# Patient Record
Sex: MALE | Race: WHITE | NOT HISPANIC OR LATINO
[De-identification: names, ages, dates, MRNs, and addresses within clinical notes are randomized per-mention and may not be internally consistent; named-entity substitution may affect disease eponyms.]

---

## 2022-04-12 ENCOUNTER — APPOINTMENT (OUTPATIENT)
Dept: ORTHOPEDIC SURGERY | Facility: CLINIC | Age: 52
End: 2022-04-12
Payer: COMMERCIAL

## 2022-04-12 VITALS — BODY MASS INDEX: 45 KG/M2 | HEIGHT: 66 IN | WEIGHT: 280 LBS

## 2022-04-12 DIAGNOSIS — Z78.9 OTHER SPECIFIED HEALTH STATUS: ICD-10-CM

## 2022-04-12 DIAGNOSIS — I10 ESSENTIAL (PRIMARY) HYPERTENSION: ICD-10-CM

## 2022-04-12 PROCEDURE — 99214 OFFICE O/P EST MOD 30 MIN: CPT | Mod: 25

## 2022-04-12 NOTE — PROCEDURE
[Large Joint Injection] : Large joint injection [Left] : of the left [Knee] : knee [Pain] : pain [Inflammation] : inflammation [Repeat series performed] : repeat series performed [Alcohol] : alcohol [Betadine] : betadine [Ethyl Chloride sprayed topically] : ethyl chloride sprayed topically [Sterile technique used] : sterile technique used [___ cc    3mg] :  Betamethasone (Celestone) ~Vcc of 3mg [___ cc    1%] : Lidocaine ~Vcc of 1%  [___ cc    0.25%] : Bupivacaine (Marcaine) ~Vcc of 0.25%  [] : Patient tolerated procedure well [Call if redness, pain or fever occur] : call if redness, pain or fever occur [Apply ice for 15min out of every hour for the next 12-24 hours as tolerated] : apply ice for 15 minutes out of every hour for the next 12-24 hours as tolerated [Previous OTC use and PT nontherapeutic] : patient has tried OTC's including aspirin, Ibuprofen, Aleve, etc or prescription NSAIDS, and/or exercises at home and/or physical therapy without satisfactory response [Patient had decreased mobility in the joint] : patient had decreased mobility in the joint [Risks, benefits, alternatives discussed / Verbal consent obtained] : the risks benefits, and alternatives have been discussed, and verbal consent was obtained [Prior failure or difficult injection] : prior failure or difficult injection [Altered anatomic landmarks d/t erosive arthritis] : altered anatomic landmarks d/t erosive arthritis [All ultrasound images have been permanently captured and stored accordingly in our picture archiving and communication system] : All ultrasound images have been permanently captured and stored accordingly in our picture archiving and communication system

## 2022-04-13 RX ORDER — AZELASTINE HYDROCHLORIDE 137 UG/1
0.1 SPRAY, METERED NASAL
Qty: 30 | Refills: 0 | Status: ACTIVE | COMMUNITY
Start: 2021-12-28

## 2022-04-13 RX ORDER — BUDESONIDE AND FORMOTEROL FUMARATE DIHYDRATE 80; 4.5 UG/1; UG/1
80-4.5 AEROSOL RESPIRATORY (INHALATION)
Qty: 10 | Refills: 0 | Status: ACTIVE | COMMUNITY
Start: 2021-12-28

## 2022-04-13 RX ORDER — HYDROCHLOROTHIAZIDE 25 MG/1
25 TABLET ORAL
Qty: 90 | Refills: 0 | Status: ACTIVE | COMMUNITY
Start: 2022-02-10

## 2022-04-13 RX ORDER — HYDROCHLOROTHIAZIDE 12.5 MG/1
12.5 TABLET ORAL
Qty: 90 | Refills: 0 | Status: ACTIVE | COMMUNITY
Start: 2021-02-11

## 2022-04-13 RX ORDER — AMLODIPINE BESYLATE 5 MG/1
5 TABLET ORAL
Qty: 90 | Refills: 0 | Status: ACTIVE | COMMUNITY
Start: 2021-02-11

## 2022-04-13 RX ORDER — OLMESARTAN MEDOXOMIL 20 MG/1
20 TABLET, FILM COATED ORAL
Qty: 90 | Refills: 0 | Status: ACTIVE | COMMUNITY
Start: 2022-02-10

## 2022-04-13 RX ORDER — MONTELUKAST 10 MG/1
10 TABLET, FILM COATED ORAL
Qty: 30 | Refills: 0 | Status: ACTIVE | COMMUNITY
Start: 2021-12-28

## 2022-04-13 RX ORDER — DOXYCYCLINE HYCLATE 100 MG/1
100 TABLET ORAL
Qty: 14 | Refills: 0 | Status: ACTIVE | COMMUNITY
Start: 2021-12-28

## 2022-04-13 NOTE — DISCUSSION/SUMMARY
[Surgical risks reviewed] : Surgical risks reviewed [de-identified] : CSI today and tolerated well. \par Surg/Non surg options discussed.\par Risk/Benefits reviewed. \par Recommend left knee arthroscopy, partial meniscectomy at this time due to pain and loss of motion.\par Risk of infection clot cont pain recurrent tear and need for further care and surgery discussed \par Advised this surgery will not help his OA and poss viscosupp after surgery discussed \par He understands he will not be 100% better. \par All questions answered and he would like to proceed. \par \par General Dx Discussion\par The patient was advised of the diagnosis. The natural history of the pathology was explained in full to the patient in layman's terms. All questions were answered. The risks and benefits of surgical and non-surgical treatment alternatives were explained in full to the patient.\par \par The patient was advised of the diagnosis.  The natural history of the pathology was explained in full to the patient in layman's terms. All questions were answered.  The risks and benefits of surgical and non-surgical treatment alternatives were explained in full to the patient.  \par The patient demonstrated a full understanding of the surgical and non-surgical options.  The risks of surgery were outlined in full to the patient including but not limited to bleeding, scarring, infection, sepsis, neurologic injury, vascular injury, failure to resolve symptoms, symptom recurrence, the need for further surgery, non-healing, wound breakdown, deep vein thrombosis, pulmonary embolism, spontaneous osteonecrosis, anesthesia complications and even death.  The patient understood all the risks and accepted them and understood that other complications could occur that are not mentioned above.  The intraoperative plan, post-operative plan, post-operative expectations and limitations were explained in full.  Expectations from non-surgical treatment were explained in full as well.  The patient demonstrated a complete understanding of the treatment alternatives and requested the above-mentioned procedure.  This will be scheduled accordingly.\par \par The patient has two pathologic conditions at this point.  There is a meniscus tear which is causing symptomology and there is also underlying osteoarthritis.  The patient was counseled that surgical intervention to address the torn meniscus will not change the symptomology attributable to the arthritis.  The patient was advised that the pain attributable to the meniscus tear should be resolved arthroscopically.  However, the patient should expect to have continued aches and pains related to the arthritis, in the form of, but not limited to pain, weather related changes, dull ache, crepitation, limitation of motion and strength and the need for further surgeries. The patient understands that the arthroscopic procedure addresses the meniscus only and that after surgery, the knee will not be 100% but it should be improved with respect to the symptomology attributed to the torn meniscus.  Patient also is aware that further treatment after arthroscopy may be necessary in the form of oral medications, cortisone and/or viscosupplementation injections, and further surgeries including knee replacement.  The patient agrees, understands and wishes to proceed.\par \par \par

## 2022-04-13 NOTE — PHYSICAL EXAM
[Left] : left knee [5___] : hamstring 5[unfilled]/5 [Positive] : positive Manjit [] : no erythema [TWNoteComboBox7] : flexion 100 degrees [de-identified] : extension 0 degrees

## 2022-04-13 NOTE — HISTORY OF PRESENT ILLNESS
[5] : 5 [6] : 6 [Burning] : burning [Rest] : rest [Full time] : Work status: full time [de-identified] : Here for f/u left knee. He had CSI 5 weeks ago and was feeling better, but he now has increasing pain. No new injury. He is leaving for [place] tomorrow and he would like another injection. He would also like to discuss surgery.  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

## 2022-06-07 ENCOUNTER — RX RENEWAL (OUTPATIENT)
Age: 52
End: 2022-06-07

## 2022-06-07 RX ORDER — MELOXICAM 15 MG/1
15 TABLET ORAL
Qty: 30 | Refills: 2 | Status: ACTIVE | COMMUNITY
Start: 2022-02-22 | End: 1900-01-01

## 2022-06-14 ENCOUNTER — APPOINTMENT (OUTPATIENT)
Dept: ORTHOPEDIC SURGERY | Facility: CLINIC | Age: 52
End: 2022-06-14
Payer: COMMERCIAL

## 2022-06-14 PROCEDURE — 99213 OFFICE O/P EST LOW 20 MIN: CPT

## 2022-06-14 NOTE — HISTORY OF PRESENT ILLNESS
[7] : 7 [5] : 5 [de-identified] : Here for f/u left knee. He was doing well until last week he started having increasing pain. No new injury. \par He would like to schedule surgery for September because of his job. He is requesting another CSI today.  [FreeTextEntry1] : left knee [FreeTextEntry5] : /

## 2022-06-14 NOTE — DISCUSSION/SUMMARY
[Surgical risks reviewed] : Surgical risks reviewed [de-identified] : General Dx Discussion\par The patient was advised of the diagnosis. The natural history of the pathology was explained in full to the patient in layman's terms. All questions were answered. The risks and benefits of surgical and non-surgical treatment alternatives were explained in full to the patient.\par \par \par He is considering arthroscopic surgery for this september. \par He has mobic he will take prn. \par Discussed Repeat CSI if needed, however patient advised he cannot have surgery for 1 month after CSI. \par All questions answered and he understood. \par \par Entered by Bernadette ELIAS acting as a scribe.\par Instructions: Dr. Colon- The documentation recorded by the scribe accurately reflects the service I personally performed and the decisions made by me.\par

## 2022-06-14 NOTE — PHYSICAL EXAM
[Left] : left knee [] : no atrophy [TWNoteComboBox7] : flexion 120 degrees [de-identified] : extension 0 degrees

## 2022-06-21 ENCOUNTER — FORM ENCOUNTER (OUTPATIENT)
Age: 52
End: 2022-06-21

## 2022-06-21 ENCOUNTER — APPOINTMENT (OUTPATIENT)
Dept: ORTHOPEDIC SURGERY | Facility: CLINIC | Age: 52
End: 2022-06-21

## 2022-07-04 ENCOUNTER — NON-APPOINTMENT (OUTPATIENT)
Age: 52
End: 2022-07-04

## 2022-07-06 ENCOUNTER — APPOINTMENT (OUTPATIENT)
Dept: ORTHOPEDIC SURGERY | Facility: AMBULATORY SURGERY CENTER | Age: 52
End: 2022-07-06

## 2022-07-06 PROCEDURE — 29875 ARTHRS KNEE SURG SYNVCT LMTD: CPT | Mod: 59,LT

## 2022-07-06 PROCEDURE — 29881 ARTHRS KNE SRG MNISECTMY M/L: CPT | Mod: LT

## 2022-07-06 PROCEDURE — 29881 ARTHRS KNE SRG MNISECTMY M/L: CPT | Mod: AS,LT

## 2022-07-06 PROCEDURE — 29875 ARTHRS KNEE SURG SYNVCT LMTD: CPT | Mod: AS,59,LT

## 2022-07-06 RX ORDER — OXYCODONE AND ACETAMINOPHEN 5; 325 MG/1; MG/1
5-325 TABLET ORAL
Qty: 30 | Refills: 0 | Status: COMPLETED | COMMUNITY
Start: 2022-07-06 | End: 2022-07-14

## 2022-07-06 RX ORDER — ASPIRIN/ACETAMINOPHEN/CAFFEINE 500-325-65
325 POWDER IN PACKET (EA) ORAL
Qty: 30 | Refills: 0 | Status: COMPLETED | COMMUNITY
Start: 2022-07-06 | End: 2022-08-05

## 2022-07-07 ENCOUNTER — NON-APPOINTMENT (OUTPATIENT)
Age: 52
End: 2022-07-07

## 2022-07-12 ENCOUNTER — APPOINTMENT (OUTPATIENT)
Dept: ORTHOPEDIC SURGERY | Facility: CLINIC | Age: 52
End: 2022-07-12

## 2022-07-12 VITALS — HEIGHT: 66 IN | WEIGHT: 280 LBS | BODY MASS INDEX: 45 KG/M2

## 2022-07-12 PROCEDURE — 99024 POSTOP FOLLOW-UP VISIT: CPT

## 2022-07-14 NOTE — PHYSICAL EXAM
[Left] : left knee [5___] : hamstring 5[unfilled]/5 [] : no calf tenderness [TWNoteComboBox7] : flexion 95 degrees [de-identified] : extension 0 degrees

## 2022-07-14 NOTE — HISTORY OF PRESENT ILLNESS
[4] : 4 [2] : 2 [Not working due to injury] : Work status: not working due to injury [de-identified] : no treatment at this time

## 2022-07-14 NOTE — DISCUSSION/SUMMARY
[de-identified] : General Dx Discussion\par The patient was advised of the diagnosis. The natural history of the pathology was explained in full to the patient in layman's terms. All questions were answered. The risks and benefits of surgical and non-surgical treatment alternatives were explained in full to the patient.\par \par will get PT \par will resume work 7/25/22 no kneeling

## 2022-07-30 ENCOUNTER — TRANSCRIPTION ENCOUNTER (OUTPATIENT)
Age: 52
End: 2022-07-30

## 2022-08-09 ENCOUNTER — APPOINTMENT (OUTPATIENT)
Dept: ORTHOPEDIC SURGERY | Facility: CLINIC | Age: 52
End: 2022-08-09

## 2022-08-09 VITALS — BODY MASS INDEX: 45 KG/M2 | HEIGHT: 66 IN | WEIGHT: 280 LBS

## 2022-08-09 DIAGNOSIS — M65.9 SYNOVITIS AND TENOSYNOVITIS, UNSPECIFIED: ICD-10-CM

## 2022-08-09 PROCEDURE — 99024 POSTOP FOLLOW-UP VISIT: CPT

## 2022-08-09 NOTE — PHYSICAL EXAM
[Left] : left knee [] : patient ambulates without assistive device [TWNoteComboBox7] : flexion 110 degrees [de-identified] : extension 0 degrees

## 2022-08-09 NOTE — HISTORY OF PRESENT ILLNESS
[1] : 2 [Localized] : localized [Physical therapy] : physical therapy [Full time] : Work status: full time [de-identified] : Here for f/u left knee s/p arthroscopy, meniscectomy. He is doing well. Reports minimal pain. He only went for 2 sessions of therapy because he had COVID.  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

## 2022-08-09 NOTE — DISCUSSION/SUMMARY
[de-identified] : Case Discussed.\par Recommend getting back into a coures of physical therapy.\par Activities as tolerated.\par Discussed the possibility of visco injections if symptoms worsen. \par f/u 6 weeks. \par \par Entered by Bernadette ELIAS acting as a scribe.\par Instructions: Dr. Colon- The documentation recorded by the scribe accurately reflects the service I personally performed and the decisions made by me.\par

## 2022-10-04 ENCOUNTER — APPOINTMENT (OUTPATIENT)
Dept: ORTHOPEDIC SURGERY | Facility: CLINIC | Age: 52
End: 2022-10-04

## 2022-10-04 VITALS — WEIGHT: 280 LBS | BODY MASS INDEX: 45 KG/M2 | HEIGHT: 66 IN

## 2022-10-04 DIAGNOSIS — M17.12 UNILATERAL PRIMARY OSTEOARTHRITIS, LEFT KNEE: ICD-10-CM

## 2022-10-04 DIAGNOSIS — Z98.890 OTHER SPECIFIED POSTPROCEDURAL STATES: ICD-10-CM

## 2022-10-04 DIAGNOSIS — S83.242D OTHER TEAR OF MEDIAL MENISCUS, CURRENT INJURY, LEFT KNEE, SUBSEQUENT ENCOUNTER: ICD-10-CM

## 2022-10-04 PROCEDURE — 99213 OFFICE O/P EST LOW 20 MIN: CPT | Mod: 24

## 2022-10-04 NOTE — DISCUSSION/SUMMARY
[de-identified] : Case Discussed.\par Doing better. \par Recommend and request authorization for Euflexxa injections. \par Continue hep. \par \par Entered by Bernadette ELIAS acting as a scribe.\par Instructions: Dr. Colon- The documentation recorded by the scribe accurately reflects the service I personally performed and the decisions made by me.\par

## 2022-10-04 NOTE — HISTORY OF PRESENT ILLNESS
[Localized] : localized [Occasional] : occasional [Work] : work [Full time] : Work status: full time [1] : 2 [Physical therapy] : physical therapy [de-identified] : Here for f/u left knee s/p arthroscopy, meniscectomy on 7/6/22.. He is doing well. Reports minimal pain.  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

## 2022-10-04 NOTE — PHYSICAL EXAM
[Left] : left knee [] : no tenderness [TWNoteComboBox7] : flexion 120 degrees [de-identified] : extension 0 degrees

## 2022-12-06 ENCOUNTER — APPOINTMENT (OUTPATIENT)
Dept: ORTHOPEDIC SURGERY | Facility: CLINIC | Age: 52
End: 2022-12-06

## 2023-01-31 ENCOUNTER — APPOINTMENT (OUTPATIENT)
Dept: ORTHOPEDIC SURGERY | Facility: CLINIC | Age: 53
End: 2023-01-31
Payer: COMMERCIAL

## 2023-01-31 VITALS — HEIGHT: 66 IN | BODY MASS INDEX: 45 KG/M2 | WEIGHT: 280 LBS

## 2023-01-31 DIAGNOSIS — M54.16 RADICULOPATHY, LUMBAR REGION: ICD-10-CM

## 2023-01-31 DIAGNOSIS — M51.36 OTHER INTERVERTEBRAL DISC DEGENERATION, LUMBAR REGION: ICD-10-CM

## 2023-01-31 DIAGNOSIS — S83.8X1A SPRAIN OF OTHER SPECIFIED PARTS OF RIGHT KNEE, INITIAL ENCOUNTER: ICD-10-CM

## 2023-01-31 PROCEDURE — 72100 X-RAY EXAM L-S SPINE 2/3 VWS: CPT

## 2023-01-31 PROCEDURE — 72170 X-RAY EXAM OF PELVIS: CPT

## 2023-01-31 PROCEDURE — 99214 OFFICE O/P EST MOD 30 MIN: CPT

## 2023-01-31 PROCEDURE — 73564 X-RAY EXAM KNEE 4 OR MORE: CPT | Mod: RT

## 2023-01-31 RX ORDER — METHYLPREDNISOLONE 4 MG/1
4 TABLET ORAL
Qty: 1 | Refills: 0 | Status: ACTIVE | COMMUNITY
Start: 2023-01-31 | End: 1900-01-01

## 2023-01-31 NOTE — IMAGING
[Disc space narrowing] : Disc space narrowing [There are no fractures, subluxations or dislocations. No significant abnormalities are seen] : There are no fractures, subluxations or dislocations. No significant abnormalities are seen [Right] : right knee [All Views] : anteroposterior, lateral, skyline, and anteroposterior standing [Degenerative change] : Degenerative change

## 2023-02-01 ENCOUNTER — FORM ENCOUNTER (OUTPATIENT)
Age: 53
End: 2023-02-01

## 2023-02-01 NOTE — PHYSICAL EXAM
[Right] : right knee [5___] : hamstring 5[unfilled]/5 [] : no lumbar paraspinal tenderness [FreeTextEntry3] : + edema [TWNoteComboBox7] : flexion 100 degrees [de-identified] : extension 0 degrees

## 2023-02-01 NOTE — HISTORY OF PRESENT ILLNESS
[Right Leg] : right leg [Result of repetitive motion] : result of repetitive motion [3] : 3 [Burning] : burning [Radiating] : radiating [Shooting] : shooting [Stabbing] : stabbing [Leisure] : leisure [Social interactions] : social interactions [Nothing helps with pain getting better] : Nothing helps with pain getting better [Full time] : Work status: full time [de-identified] : Patient c/o right knee and leg pain since the end of December after sitting for a long car ride. Occasional numbness and tingling and back pain. He saw a vascular surgeon and had doppler done which was negative for DVT. It did show possible ruptured popliteal cyst. He denies any calf pain. Pain is in his thigh and shin and knee.  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

## 2023-02-01 NOTE — DISCUSSION/SUMMARY
[de-identified] : General Dx Discussion\par The patient was advised of the diagnosis. The natural history of the pathology was explained in full to the patient in layman's terms. All questions were answered. The risks and benefits of surgical and non-surgical treatment alternatives were explained in full to the patient.\par \par Case Discussed.\par Recommend MDP.\par Advised to elevate his right leg above his heart to help with swelling. \par Will get mri right knee for further evaluation of meniscus tear. \par Will get mri lumbar spine for further evaluation of hnp/stenosis. \par f/u after mri's. \par \par Entered by Bernadette ELIAS acting as a scribe.\par Instructions: Dr. Colon- The documentation recorded by the scribe accurately reflects the service I personally performed and the decisions made by me.\par

## 2023-02-02 ENCOUNTER — APPOINTMENT (OUTPATIENT)
Dept: MRI IMAGING | Facility: CLINIC | Age: 53
End: 2023-02-02
Payer: COMMERCIAL

## 2023-02-02 PROCEDURE — 73721 MRI JNT OF LWR EXTRE W/O DYE: CPT | Mod: RT

## 2023-02-02 PROCEDURE — 72148 MRI LUMBAR SPINE W/O DYE: CPT

## 2023-02-07 ENCOUNTER — APPOINTMENT (OUTPATIENT)
Dept: ORTHOPEDIC SURGERY | Facility: CLINIC | Age: 53
End: 2023-02-07
Payer: COMMERCIAL

## 2023-02-07 VITALS — WEIGHT: 280 LBS | BODY MASS INDEX: 45 KG/M2 | HEIGHT: 66 IN

## 2023-02-07 DIAGNOSIS — M48.061 SPINAL STENOSIS, LUMBAR REGION WITHOUT NEUROGENIC CLAUDICATION: ICD-10-CM

## 2023-02-07 DIAGNOSIS — M23.91 UNSPECIFIED INTERNAL DERANGEMENT OF RIGHT KNEE: ICD-10-CM

## 2023-02-07 DIAGNOSIS — D18.09 HEMANGIOMA OF OTHER SITES: ICD-10-CM

## 2023-02-07 DIAGNOSIS — S83.241D OTHER TEAR OF MEDIAL MENISCUS, CURRENT INJURY, RIGHT KNEE, SUBSEQUENT ENCOUNTER: ICD-10-CM

## 2023-02-07 DIAGNOSIS — M17.11 UNILATERAL PRIMARY OSTEOARTHRITIS, RIGHT KNEE: ICD-10-CM

## 2023-02-07 PROCEDURE — 99214 OFFICE O/P EST MOD 30 MIN: CPT

## 2023-02-07 NOTE — REASON FOR VISIT
[FreeTextEntry2] : MRI follow up for l spine and rt knee pain\par medrol has helped feels 50% better \par

## 2023-02-07 NOTE — DATA REVIEWED
[Lumbar Spine] : lumbar spine [MRI] : MRI [Right] : of the right [Knee] : knee [Report was reviewed and noted in the chart] : The report was reviewed and noted in the chart [FreeTextEntry1] : 1. Findings suggesting retroperitoneal adenopathy asymmetric towards the left in the upper lumbar regions, not \par adequately evaluated on the current exam. Recommend CT scan of the abdomen and pelvis with intravenous \par contrast material to further evaluate.\par 2. Large hemangioma suspected in the left aspect of T12 extending to the left pedicle and left posterior elements \par measuring at least 4-5 cm in size without aggressive characteristics on the portions included on the current \par exam.\par 3. Convexity of the lumbar spine towards the right, exaggerated lumbar lordosis, slight anterolisthesis at L4-L5, \par and L5-S1, and transitional S1-S2 disc segment. [FreeTextEntry2] : 1. Tricompartmental arthrosis most severe medially where there is severe complex medial meniscal tearing, \par severe synovitis at the posterior medial meniscal root and medial extrusion of the medial meniscus with mild-tomoderate subchondral edema in the medial compartment.\par 2. Chronic ligament sprains, MCL laxity, extensor mechanism tendinopathy, prepatellar soft tissue swelling, \par subcutaneous edema, extensor mechanism tendinopathy, effusion, and large popliteal cyst.\par 3. Patient motion degrades image quality on multiple sequences.

## 2023-02-07 NOTE — DISCUSSION/SUMMARY
[de-identified] : General Dx Discussion\par The patient was advised of the diagnosis. The natural history of the pathology was explained in full to the patient in layman's terms. All questions were answered. The risks and benefits of surgical and non-surgical treatment alternatives were explained in full to the patient.\par \par Case Discussed.\par Will see his primary care for adenopathy \par will see Dr French/Daniel for hemangioma \par f/u 6 weeks for his knee, has no meniscal symptoms today, poss of future surgery discussed PT for his knee \par questions answered \par \par Entered by Bernadette ELIAS acting as a scribe.\par Instructions: Dr. Colon- The documentation recorded by the scribe accurately reflects the service I personally performed and the decisions made by me.\par

## 2023-02-07 NOTE — HISTORY OF PRESENT ILLNESS
[Lower back] : lower back [2] : 2 [Localized] : localized [Full time] : Work status: full time [de-identified] : Here for f/u right knee and lower back to discuss mri results.  [] : no [FreeTextEntry1] : rt knee [FreeTextEntry9] : prednisone [de-identified] : MRI [facility]

## 2023-02-07 NOTE — PHYSICAL EXAM
[Right] : right knee [5___] : hamstring 5[unfilled]/5 [Negative] : negative Jordan's [] : no lateral joint line tenderness [FreeTextEntry3] : + edema [TWNoteComboBox7] : flexion 115 degrees [de-identified] : extension 0 degrees

## 2023-03-14 ENCOUNTER — APPOINTMENT (OUTPATIENT)
Dept: ORTHOPEDIC SURGERY | Facility: CLINIC | Age: 53
End: 2023-03-14

## 2023-04-11 ENCOUNTER — APPOINTMENT (OUTPATIENT)
Dept: ORTHOPEDIC SURGERY | Facility: CLINIC | Age: 53
End: 2023-04-11

## 2023-06-24 NOTE — REVIEW OF SYSTEMS
Physical Therapy    Patient not seen in therapy.     PT orders received and chart reviewed. Pt reporting fatigue from getting to and from the bathroom with nursing staff. Will re attempt tomorrow.       OBJECTIVE                      Documented in the chart in the following areas: Assessment/Plan.        Therapy procedure time and total treatment time can be found documented on the Time Entry flowsheet   [Joint Pain] : joint pain [Joint Stiffness] : joint stiffness [Joint Swelling] : joint swelling [FreeTextEntry9] : left knee